# Patient Record
Sex: MALE | Race: WHITE | NOT HISPANIC OR LATINO | Employment: FULL TIME | URBAN - METROPOLITAN AREA
[De-identification: names, ages, dates, MRNs, and addresses within clinical notes are randomized per-mention and may not be internally consistent; named-entity substitution may affect disease eponyms.]

---

## 2021-06-02 ENCOUNTER — OFFICE VISIT CONVERTED (OUTPATIENT)
Dept: OTOLARYNGOLOGY | Facility: CLINIC | Age: 61
End: 2021-06-02
Attending: OTOLARYNGOLOGY

## 2021-06-02 ENCOUNTER — CONVERSION ENCOUNTER (OUTPATIENT)
Dept: OTOLARYNGOLOGY | Facility: CLINIC | Age: 61
End: 2021-06-02

## 2021-06-05 NOTE — H&P
"   History and Physical      Patient Name: Cedric Canseco   Patient ID: [id number]   Sex: Male   YOB: 1960    Primary Care Provider: No PCP No PCP Other   Referring Provider: Tyrel Weiner    Visit Date: June 2, 2021    Provider: Manoj Fink MD   Location: [facility]   Location Address: [address] [place] [address], [address] [place] [address]  [id number]   Location Phone: [phone]          Chief Complaint     \"It just happened really quickly.\"       History Of Present Illness  Cedric Canseco is a 60 year old /White male who presents to the office today as a consult from Tyrel Weiner for evaluation of left orbital fractures. He tells me that he was working on the mowing deck of his [employer] tractor when a drop down quickly smashing his face. Immediately noticed bleeding from his face and swelling to the left side of his face. He was evaluated in the emergency department on the afternoon of 5/28/2021 where a CT scan of the face without contrast revealed minimally displaced left orbital floor and moderately displaced left lamina papyracea fracture. There is no evidence of entrapment radiographically and on examination that day he was moving his eye fully according to RADHAMES Mosquera. He was discharged home with erythromycin ointment, Norco, and Keflex. He tells me that he feels as though his left facial swelling has improved significantly. He denies any vision changes, diplopia, epistaxis, malocclusion, or trismus. He has never experienced any prior head or neck trauma or surgery. He does not smoke.       Past Medical History  Hypertension; Orbital fracture         Past Surgical History  *No Past Surgical History         Allergy List  NO KNOWN DRUG ALLERGIES         Family Medical History  *No Known Family History         Social History  *Denies Alcohol Use; Tobacco (Never)         Review of Systems  · Constitutional  o Denies  o : fever, night sweats, weight " "loss  · Eyes  o Denies  o : discharge from eye, impaired vision  · HENT  o Admits  o : *See HPI  · Cardiovascular  o Denies  o : chest pain, irregular heart beats  · Respiratory  o Denies  o : shortness of breath, wheezing, coughing up blood  · Gastrointestinal  o Denies  o : heartburn, reflux, vomiting blood  · Genitourinary  o Denies  o : frequency  · Integument  o Denies  o : rash, skin dryness  · Neurologic  o Denies  o : seizures, loss of balance, loss of consciousness, dizziness  · Endocrine  o Denies  o : cold intolerance, heat intolerance  · Heme-Lymph  o Denies  o : easy bleeding, anemia      Vitals  Date Time BP Position Site L\R Cuff Size HR RR TEMP (F) WT  HT  BMI kg/m2 BSA m2 O2 Sat FR L/min FiO2 HC       06/02/2021 04:27 PM        97 204lbs 8oz 5'  6\" 33.01 2.08             Physical Examination  · Constitutional  o Appearance  o : well developed, well-nourished, alert and in no acute distress, voice clear and strong  · Head and Face  o Head  o :   § Inspection  § : no deformities or lesions  o Face  o :   § Inspection  § : Left periorbital ecchymosis and edema; House-Brackmann I/VI bilaterally. No mobility or step-offs upon palpation. Left lid laceration is clean, dry, and intact with sutures in place. Same for the nasal dorsum laceration  § Palpation  § : No TMJ crepitus nor  muscle tenderness bilaterally  · Eyes  o Vision  o :   § Visual Fields  § : Extraocular movements are intact. No spontaneous or gaze-induced nystagmus.  o Conjunctivae  o : On the right, subconjunctival hemorrhage on the left  o Sclerae  o : clear  o Pupils and Irises  o : pupils equal, round, and reactive to light.   · Ears, Nose, Mouth and Throat  o Ears  o :   § External Ears  § : appearance within normal limits, no lesions present  § Otoscopic Examination  § : tympanic membrane appearance within normal limits bilaterally without perforations, well-aerated middle ears  § Hearing  § : intact to conversational voice " both ears  o Nose  o :   § External Nose  § : appearance normal  § Intranasal Exam  § : mucosa within normal limits, vestibules normal, no intranasal lesions present, septum midline, sinuses non tender to percussion  o Oral Cavity  o :   § Oral Mucosa  § : oral mucosa normal without pallor or cyanosis  § Lips  § : lip appearance normal  § Teeth  § : Partial, worn dentition for age  § Gums  § : gums pink, non-swollen, no bleeding present  § Tongue  § : tongue appearance normal; normal mobility  § Palate  § : hard palate normal, soft palate appearance normal with symmetric mobility  o Throat  o :   § Oropharynx  § : no inflammation or lesions present, tonsils within normal limits  § Hypopharynx  § : Deferred secondary to gag reflex  § Larynx  § : Deferred secondary to gag reflex  · Neck  o Inspection/Palpation  o : normal appearance, no masses or tenderness, trachea midline; thyroid size normal, nontender, no nodules or masses present on palpation  · Respiratory  o Respiratory Effort  o : breathing unlabored  o Inspection of Chest  o : normal appearance, no retractions  · Cardiovascular  o Heart  o : regular rate and rhythm  · Lymphatic  o Neck  o : no lymphadenopathy present  o Supraclavicular Nodes  o : no lymphadenopathy present  o Preauricular Nodes  o : no lymphadenopathy present  · Skin and Subcutaneous Tissue  o General Inspection  o : Regarding face and neck - there are no rashes present, no lesions present, and no areas of discoloration  · Neurologic  o Cranial Nerves  o : cranial nerves II-XII are grossly intact bilaterally  o Gait and Station  o : normal gait, able to stand without diffculty  · Psychiatric  o Judgement and Insight  o : judgment and insight intact  o Mood and Affect  o : mood normal, affect appropriate          Assessment  · Orbital floor (blow-out) open fracture, with routine healing, subsequent encounter     V54.19/S02.30XD  · Lamina papyracea  fracture     801.00/S02.19XA      Plan  · Medications  o Medications have been Reconciled  o Transition of Care or Provider Policy  · Instructions  o Impressions and findings were discussed with Mr. Canseco and his wife at great length. Currently, he is seen for evaluation of a left orbital floor and left lamina papyracea fracture. Examination today reveals significant left periorbital ecchymosis and edema, left subconjunctival hemorrhage, but full extraocular eye movements and no evidence of enophthalmos. We reviewed the images from his 5/28/2021 CT scan of the face without contrast. We discussed the pathophysiology and natural history of this injury. Options for management were discussed and as there is no evidence of entrapment and he is unlikely to develop enophthalmos we will pursue natural healing. We discussed the expected course and natural healing and he may return to work this following Monday and follow-up with me on an as-needed basis.  · Correspondence  o ENT Letter to Referring MD (Tyrel Weiner) - 06/02/2021            Electronically Signed by: Manoj Fink MD -Author on June 2, 2021 05:30:27 PM

## 2021-07-15 VITALS — WEIGHT: 204.5 LBS | TEMPERATURE: 97 F | HEIGHT: 66 IN | BODY MASS INDEX: 32.87 KG/M2

## 2022-01-11 ENCOUNTER — HOSPITAL ENCOUNTER (EMERGENCY)
Facility: HOSPITAL | Age: 62
Discharge: HOME | End: 2022-01-11
Attending: EMERGENCY MEDICINE | Admitting: EMERGENCY MEDICINE

## 2022-01-11 VITALS
RESPIRATION RATE: 17 BRPM | WEIGHT: 199.08 LBS | TEMPERATURE: 97.4 F | HEIGHT: 66 IN | OXYGEN SATURATION: 99 % | SYSTOLIC BLOOD PRESSURE: 154 MMHG | BODY MASS INDEX: 31.99 KG/M2 | HEART RATE: 61 BPM | DIASTOLIC BLOOD PRESSURE: 91 MMHG

## 2022-01-11 DIAGNOSIS — E87.6 HYPOKALEMIA: ICD-10-CM

## 2022-01-11 DIAGNOSIS — R33.9 URINARY RETENTION: Primary | ICD-10-CM

## 2022-01-11 DIAGNOSIS — N30.90 CYSTITIS: ICD-10-CM

## 2022-01-11 LAB
ALBUMIN SERPL-MCNC: 4.8 G/DL (ref 3.5–5.2)
ALBUMIN/GLOB SERPL: 1.4 G/DL
ALP SERPL-CCNC: 177 U/L (ref 39–117)
ALT SERPL W P-5'-P-CCNC: 24 U/L (ref 1–41)
ANION GAP SERPL CALCULATED.3IONS-SCNC: 14.1 MMOL/L (ref 5–15)
AST SERPL-CCNC: 26 U/L (ref 1–40)
BACTERIA UR QL AUTO: ABNORMAL /HPF
BASOPHILS # BLD AUTO: 0.03 10*3/MM3 (ref 0–0.2)
BASOPHILS NFR BLD AUTO: 0.4 % (ref 0–1.5)
BILIRUB SERPL-MCNC: 0.5 MG/DL (ref 0–1.2)
BILIRUB UR QL STRIP: NEGATIVE
BUN SERPL-MCNC: 16 MG/DL (ref 8–23)
BUN/CREAT SERPL: 14.5 (ref 7–25)
CALCIUM SPEC-SCNC: 9.6 MG/DL (ref 8.6–10.5)
CHLORIDE SERPL-SCNC: 91 MMOL/L (ref 98–107)
CLARITY UR: CLEAR
CO2 SERPL-SCNC: 26.9 MMOL/L (ref 22–29)
COLOR UR: YELLOW
CREAT SERPL-MCNC: 1.1 MG/DL (ref 0.76–1.27)
DEPRECATED RDW RBC AUTO: 39.3 FL (ref 37–54)
EOSINOPHIL # BLD AUTO: 0.05 10*3/MM3 (ref 0–0.4)
EOSINOPHIL NFR BLD AUTO: 0.6 % (ref 0.3–6.2)
ERYTHROCYTE [DISTWIDTH] IN BLOOD BY AUTOMATED COUNT: 12.9 % (ref 12.3–15.4)
GFR SERPL CREATININE-BSD FRML MDRD: 68 ML/MIN/1.73
GLOBULIN UR ELPH-MCNC: 3.5 GM/DL
GLUCOSE SERPL-MCNC: 106 MG/DL (ref 65–99)
GLUCOSE UR STRIP-MCNC: NEGATIVE MG/DL
HCT VFR BLD AUTO: 47 % (ref 37.5–51)
HGB BLD-MCNC: 16.7 G/DL (ref 13–17.7)
HGB UR QL STRIP.AUTO: NEGATIVE
HYALINE CASTS UR QL AUTO: ABNORMAL /LPF
IMM GRANULOCYTES # BLD AUTO: 0.03 10*3/MM3 (ref 0–0.05)
IMM GRANULOCYTES NFR BLD AUTO: 0.4 % (ref 0–0.5)
KETONES UR QL STRIP: NEGATIVE
LEUKOCYTE ESTERASE UR QL STRIP.AUTO: ABNORMAL
LYMPHOCYTES # BLD AUTO: 1.37 10*3/MM3 (ref 0.7–3.1)
LYMPHOCYTES NFR BLD AUTO: 17.7 % (ref 19.6–45.3)
MCH RBC QN AUTO: 30 PG (ref 26.6–33)
MCHC RBC AUTO-ENTMCNC: 35.5 G/DL (ref 31.5–35.7)
MCV RBC AUTO: 84.5 FL (ref 79–97)
MONOCYTES # BLD AUTO: 0.73 10*3/MM3 (ref 0.1–0.9)
MONOCYTES NFR BLD AUTO: 9.4 % (ref 5–12)
NEUTROPHILS NFR BLD AUTO: 5.53 10*3/MM3 (ref 1.7–7)
NEUTROPHILS NFR BLD AUTO: 71.5 % (ref 42.7–76)
NITRITE UR QL STRIP: NEGATIVE
NRBC BLD AUTO-RTO: 0 /100 WBC (ref 0–0.2)
PH UR STRIP.AUTO: 6 [PH] (ref 5–8)
PLATELET # BLD AUTO: 260 10*3/MM3 (ref 140–450)
PMV BLD AUTO: 9.7 FL (ref 6–12)
POTASSIUM SERPL-SCNC: 2.7 MMOL/L (ref 3.5–5.2)
PROT SERPL-MCNC: 8.3 G/DL (ref 6–8.5)
PROT UR QL STRIP: NEGATIVE
RBC # BLD AUTO: 5.56 10*6/MM3 (ref 4.14–5.8)
RBC # UR STRIP: ABNORMAL /HPF
REF LAB TEST METHOD: ABNORMAL
SODIUM SERPL-SCNC: 132 MMOL/L (ref 136–145)
SP GR UR STRIP: 1.01 (ref 1–1.03)
SQUAMOUS #/AREA URNS HPF: ABNORMAL /HPF
UROBILINOGEN UR QL STRIP: ABNORMAL
WBC # UR STRIP: ABNORMAL /HPF
WBC NRBC COR # BLD: 7.74 10*3/MM3 (ref 3.4–10.8)

## 2022-01-11 PROCEDURE — 87186 SC STD MICRODIL/AGAR DIL: CPT | Performed by: EMERGENCY MEDICINE

## 2022-01-11 PROCEDURE — 51798 US URINE CAPACITY MEASURE: CPT

## 2022-01-11 PROCEDURE — 80053 COMPREHEN METABOLIC PANEL: CPT | Performed by: EMERGENCY MEDICINE

## 2022-01-11 PROCEDURE — 81001 URINALYSIS AUTO W/SCOPE: CPT | Performed by: EMERGENCY MEDICINE

## 2022-01-11 PROCEDURE — 87086 URINE CULTURE/COLONY COUNT: CPT | Performed by: EMERGENCY MEDICINE

## 2022-01-11 PROCEDURE — 87077 CULTURE AEROBIC IDENTIFY: CPT | Performed by: EMERGENCY MEDICINE

## 2022-01-11 PROCEDURE — 99283 EMERGENCY DEPT VISIT LOW MDM: CPT

## 2022-01-11 PROCEDURE — 85025 COMPLETE CBC W/AUTO DIFF WBC: CPT | Performed by: EMERGENCY MEDICINE

## 2022-01-11 PROCEDURE — 36415 COLL VENOUS BLD VENIPUNCTURE: CPT

## 2022-01-11 RX ORDER — CEPHALEXIN 500 MG/1
500 CAPSULE ORAL 2 TIMES DAILY
Qty: 14 CAPSULE | Refills: 0 | Status: SHIPPED | OUTPATIENT
Start: 2022-01-11

## 2022-01-11 RX ORDER — ATENOLOL AND CHLORTHALIDONE 50; 25 MG/1; MG/1
TABLET ORAL EVERY 12 HOURS SCHEDULED
COMMUNITY

## 2022-01-11 RX ORDER — POTASSIUM CHLORIDE 750 MG/1
40 CAPSULE, EXTENDED RELEASE ORAL ONCE
Status: COMPLETED | OUTPATIENT
Start: 2022-01-11 | End: 2022-01-11

## 2022-01-11 RX ORDER — TAMSULOSIN HYDROCHLORIDE 0.4 MG/1
1 CAPSULE ORAL DAILY
Qty: 14 CAPSULE | Refills: 0 | Status: SHIPPED | OUTPATIENT
Start: 2022-01-11 | End: 2022-01-14 | Stop reason: SDUPTHER

## 2022-01-11 RX ORDER — METHYLPREDNISOLONE 4 MG/1
4 TABLET ORAL DAILY
COMMUNITY
End: 2022-01-13

## 2022-01-11 RX ORDER — POTASSIUM CHLORIDE 750 MG/1
10 TABLET, FILM COATED, EXTENDED RELEASE ORAL DAILY
Qty: 3 TABLET | Refills: 0 | Status: SHIPPED | OUTPATIENT
Start: 2022-01-11

## 2022-01-11 RX ADMIN — POTASSIUM CHLORIDE 40 MEQ: 750 CAPSULE, EXTENDED RELEASE ORAL at 18:41

## 2022-01-11 NOTE — ED PROVIDER NOTES
Subjective   PT states today started to have trouble urinating. Feels like he can't go completely and when he does it dribbles. Has not had this before. Denies blood in urine or burning when he does urinate. No recent surgeries.  No prostate issues/bladder issues that he knows of.  Does not have PCP/urologist.       History provided by:  Patient  Dysuria  Location:  Retention  Severity:  Moderate  Onset quality:  Sudden  Duration:  12 hours  Timing:  Unable to specify  Progression:  Worsening  Chronicity:  New  Associated symptoms: no fatigue and no fever        Review of Systems   Constitutional: Negative for appetite change, chills, fatigue and fever.   HENT: Negative.    Eyes: Negative.  Negative for photophobia.   Respiratory: Negative.    Gastrointestinal: Negative.    Endocrine: Negative.    Genitourinary: Positive for dysuria.   Musculoskeletal: Negative.    Skin: Negative.    Allergic/Immunologic: Negative.  Negative for immunocompromised state.   Neurological: Negative.    Hematological: Negative.    Psychiatric/Behavioral: Negative.    All other systems reviewed and are negative.      Past Medical History:   Diagnosis Date   • Arthritis        No Known Allergies    History reviewed. No pertinent surgical history.    History reviewed. No pertinent family history.    Social History     Socioeconomic History   • Marital status:            Objective   Physical Exam  Vitals and nursing note reviewed.   Constitutional:       General: He is not in acute distress.     Appearance: Normal appearance. He is normal weight. He is not ill-appearing or toxic-appearing.   HENT:      Head: Normocephalic and atraumatic.   Cardiovascular:      Rate and Rhythm: Normal rate and regular rhythm.      Heart sounds: Normal heart sounds.   Pulmonary:      Effort: Pulmonary effort is normal.      Breath sounds: Normal breath sounds.   Abdominal:      General: Abdomen is flat. Bowel sounds are normal.      Palpations: Abdomen  is soft.      Tenderness: There is no right CVA tenderness or left CVA tenderness.   Musculoskeletal:         General: Normal range of motion.      Cervical back: Normal range of motion.   Neurological:      Mental Status: He is alert and oriented to person, place, and time. Mental status is at baseline.   Psychiatric:         Mood and Affect: Mood normal.         Behavior: Behavior normal.         Thought Content: Thought content normal.         Judgment: Judgment normal.         ED Course  ED Course as of 01/11/22 1901 Tue Jan 11, 2022   1734 Roldan placed in patient due to patient retaining around 700 ml.  [BE]   1824 Pt feels better but does not want roldan in. Aware that he may have to return.  [BE]      ED Course User Index  [BE] Donell Burciaga PA            MDM  Number of Diagnoses or Management Options  Diagnosis management comments: Pt is a 61 y.o. male that presents today with Patient presents with:  Difficulty Urinating       Work up today:  Lab Results (last 24 hours)     Procedure Component Value Units Date/Time    CBC & Differential ([id number])  (Abnormal) Collected: 01/11/22 1648    Specimen: Blood Updated: 01/11/22 1707    Narrative:      The following orders were created for panel order CBC & Differential.  Procedure                               Abnormality         Status                       ---------                               -----------         ------                       CBC Auto Differential([id number])        Abnormal            Final result                   Please view results for these tests on the individual orders.    Comprehensive Metabolic Panel ([id number])  (Abnormal) Collected: 01/11/22 1648    Specimen: Blood Updated: 01/11/22 1722     Glucose 106 mg/dL      BUN 16 mg/dL      Creatinine 1.10 mg/dL      Sodium 132 mmol/L      Potassium 2.7 mmol/L      Chloride 91 mmol/L      CO2 26.9 mmol/L      Calcium 9.6 mg/dL      Total Protein 8.3 g/dL      Albumin 4.80 g/dL      ALT  (SGPT) 24 U/L      AST (SGOT) 26 U/L      Alkaline Phosphatase 177 U/L      Total Bilirubin 0.5 mg/dL      eGFR Non African Amer 68 mL/min/1.73      Globulin 3.5 gm/dL      A/G Ratio 1.4 g/dL      BUN/Creatinine Ratio 14.5     Anion Gap 14.1 mmol/L     Narrative:      GFR Normal >60  Chronic Kidney Disease <60  Kidney Failure <15      CBC Auto Differential ([id number])  (Abnormal) Collected: 01/11/22 1648    Specimen: Blood Updated: 01/11/22 1707     WBC 7.74 10*3/mm3      RBC 5.56 10*6/mm3      Hemoglobin 16.7 g/dL      Hematocrit 47.0 %      MCV 84.5 fL      MCH 30.0 pg      MCHC 35.5 g/dL      RDW 12.9 %      RDW-SD 39.3 fl      MPV 9.7 fL      Platelets 260 10*3/mm3      Neutrophil % 71.5 %      Lymphocyte % 17.7 %      Monocyte % 9.4 %      Eosinophil % 0.6 %      Basophil % 0.4 %      Immature Grans % 0.4 %      Neutrophils, Absolute 5.53 10*3/mm3      Lymphocytes, Absolute 1.37 10*3/mm3      Monocytes, Absolute 0.73 10*3/mm3      Eosinophils, Absolute 0.05 10*3/mm3      Basophils, Absolute 0.03 10*3/mm3      Immature Grans, Absolute 0.03 10*3/mm3      nRBC 0.0 /100 WBC     Urinalysis With Culture If Indicated - Urine, Clean Catch ([id number])    (Abnormal) Collected: 01/11/22 1730    Specimen: Urine, Clean Catch Updated: 01/11/22 1808     Color, UA Yellow     Appearance, UA Clear     pH, UA 6.0     Specific Gravity, UA 1.012     Glucose, UA Negative     Ketones, UA Negative     Bilirubin, UA Negative     Blood, UA Negative     Protein, UA Negative     Leuk Esterase, UA Trace     Nitrite, UA Negative     Urobilinogen, UA 1.0 E.U./dL    Urinalysis, Microscopic Only - Urine, Clean Catch ([id number])  (Abnormal)   Collected: 01/11/22 1730    Specimen: Urine, Clean Catch Updated: 01/11/22 1820     RBC, UA 0-2 /HPF      WBC, UA 6-12 /HPF      Bacteria, UA 2+ /HPF      Squamous Epithelial Cells, UA 0-2 /HPF      Hyaline Casts, UA 0-2 /LPF      Methodology Automated Microscopy    Urine Culture - Urine, Urine, Clean  Catch ([id number]) Collected: 01/11/22   1730    Specimen: Urine, Clean Catch Updated: 01/11/22 1820      No results found.   @No orders to display     Pt presents with urinary retention for about 12 hours. Has some dribbling when he attempts to urinate. Denies blood or pain with urination.  No recent surgeries. Bladder scan showed about 700 ml in.  Roldan placed.  Symptoms better.  Incidentally found mild hypokalemia at 2.7 probably due to Chlorthalidone for BP.  Given oral Potassium here and will give him a few days for home.   He does not want roldan for home and aware he may return. Does have UTI.     The patient will pursue further outpatient evaluation with the primary care physician or other designated or consulting physician as outlined in the discharge instructions. They are agreeable to this plan of care and follow-up instructions have been explained in detail. The patient has received these instructions in written format and have expressed an understanding of the discharge instructions. The patient is aware that any significant change in condition or worsening of symptoms should prompt an immediate return to this or the closest emergency department or call to 911.  Pt is otherwise non toxic and non ill appearing and stable for d/c home.  Pt is in agreement with this.  All questions answered at bedside.        Amount and/or Complexity of Data Reviewed  Clinical lab tests: reviewed    Risk of Complications, Morbidity, and/or Mortality  Presenting problems: moderate  Diagnostic procedures: moderate  Management options: moderate    Patient Progress  Patient progress: stable      Final diagnoses:   Urinary retention   Cystitis   Hypokalemia       ED Disposition  ED Disposition     ED Disposition Condition Comment    Discharge Stable           Betzy Louise MD  [address] [facility] [place] [address]  [phone]               Medication List      New Prescriptions    cephalexin 500 MG capsule  Commonly known  as: KEFLEX  Take 1 capsule by mouth 2 (Two) Times a Day.     potassium chloride 10 MEQ CR tablet  Take 1 tablet by mouth Daily.     tamsulosin 0.4 MG capsule 24 hr capsule  Commonly known as: FLOMAX  Take 1 capsule by mouth Daily.           Where to Get Your Medications      These medications were sent to [facility] [address] [place], [place] [address], [place] - [address] [facility] AT [facility] [address] - [phone]  - [phone] [address] [address]    Phone: [phone]   · cephalexin 500 MG capsule  · potassium chloride 10 MEQ CR tablet  · tamsulosin 0.4 MG capsule 24 hr capsule          Donell Burciaga PA  01/11/22 1900       Donell Burciaga PA  01/11/22 1901     PAIN

## 2022-01-12 ENCOUNTER — HOSPITAL ENCOUNTER (EMERGENCY)
Facility: HOSPITAL | Age: 62
Discharge: HOME | End: 2022-01-12
Attending: EMERGENCY MEDICINE | Admitting: EMERGENCY MEDICINE

## 2022-01-12 ENCOUNTER — TELEPHONE (OUTPATIENT)
Dept: UROLOGY | Facility: CLINIC | Age: 62
End: 2022-01-12

## 2022-01-12 VITALS
HEART RATE: 67 BPM | HEIGHT: 66 IN | OXYGEN SATURATION: 100 % | BODY MASS INDEX: 31.21 KG/M2 | WEIGHT: 194.22 LBS | SYSTOLIC BLOOD PRESSURE: 151 MMHG | RESPIRATION RATE: 16 BRPM | TEMPERATURE: 97.5 F | DIASTOLIC BLOOD PRESSURE: 93 MMHG

## 2022-01-12 DIAGNOSIS — R33.8 ACUTE URINARY RETENTION: Primary | ICD-10-CM

## 2022-01-12 DIAGNOSIS — U07.1 COVID-19: ICD-10-CM

## 2022-01-12 DIAGNOSIS — E87.6 HYPOKALEMIA: ICD-10-CM

## 2022-01-12 PROCEDURE — 99283 EMERGENCY DEPT VISIT LOW MDM: CPT

## 2022-01-12 NOTE — ED TRIAGE NOTES
Pt to ED 10 with c/o urinary retention. Pt states seen yesterday for same, was encouraged to stay and have urinary catheter remain but but declined and went home.  Pt reports has not had any urine output since cath was removed around 1630 yesterday.   Pt reports feeling of bladder fullness, states has not been able to get but a few drops of urine out when straining.  Pt a/ox4, gcs 15, in nad at this time.

## 2022-01-12 NOTE — TELEPHONE ENCOUNTER
[facility] ER 01/11 for retention.  Told to f/u with Dr. Louise.    [facility] ER 01/12 for retention.  Told to f/u with Dr. Parsons.    No imaging.       Patient with roldan catheter and Sonny, who called for him, said he was told to f/u this week for catheter removal.

## 2022-01-12 NOTE — ED NOTES
Patient discharged with roldan catheter and leg bag in place.      Jeanine Chavira, RN  01/12/22 0751

## 2022-01-12 NOTE — ED PROVIDER NOTES
Time: 0700  Arrived by: Private vehicle  Chief Complaint: Urinary retention  History provided by: Patient    History of Present Illness:  Patient is a 61 y.o. year old male that presents to the emergency department with history of recent diagnosis of COVID-19 now on some steroids and Z-Nahun antibiotics and cough syrup, who was just seen in the ED last night for urinary retention, now presents with similar symptoms.    Essentially he has to urinate badly and is only able to dribble a small amount of urine.    He has never had issues with prostate before.    He is on some NyQuil and other cough medicine that could be causing some retention.    He just had a straight catheter in and out performed yesterday in the ED with output of 1 L of retained urine, but did not want to go home with a catheter at that time.    He is back with the same exact issues and some suprapubic discomfort and fullness and unable to void.    As far as COVID he denies any dyspnea or fevers or trouble breathing and he has been vaccinated.    Similar Symptoms Previously: Yes  Recently seen: Yes, seen in ED last night for the same thing.      Patient Care Team  Primary Care Provider: Unknown    Past Medical History:   Urinary retention, COVID-19 positive    Social History     Socioeconomic History   • Marital status:    Tobacco Use   • Smoking status: Never Smoker   • Smokeless tobacco: Never Used   Vaping Use   • Vaping Use: Never used   Substance and Sexual Activity   • Alcohol use: Never   • Drug use: Never   • Sexual activity: Defer         No Known Allergies  Past Medical History:   Diagnosis Date   • Arthritis      History reviewed. No pertinent surgical history.  History reviewed. No pertinent family history.    Home Medications:  Prior to Admission medications    Medication Sig Start Date End Date Taking? Authorizing Provider   atenolol 50 MG tablet 50 mg, chlorthalidone 25 MG tablet 25 mg per tablet Every 12 (Twelve) Hours.     "Provider, MD Miguel   cephalexin (KEFLEX) 500 MG capsule Take 1 capsule by mouth 2 (Two) Times a Day. 1/11/22   Donell Burciaga PA   methylPREDNISolone (MEDROL) 4 MG tablet Take 4 mg by mouth Daily.    ProviderMiguel MD   potassium chloride 10 MEQ CR tablet Take 1 tablet by mouth Daily. 1/11/22   Donell Burciaga PA   tamsulosin (FLOMAX) 0.4 MG capsule 24 hr capsule Take 1 capsule by mouth Daily. 1/11/22   Donell Burciaga PA        Record Review:  I have reviewed the patient's records in [facility].     Review of Systems:  Review of Systems   I performed a 10 point review of systems that was all negative except for some cough and congestion, urinary retention.      Physical Exam:  /93   Pulse 67   Temp 97.5 °F (36.4 °C) (Oral)   Resp 16   Ht 167.6 cm (66\")   Wt 88.1 kg (194 lb 3.6 oz)   SpO2 100%   BMI 31.35 kg/m²     Physical Exam   General: Awake alert and in no obvious distress    HEENT: Head normocephalic atraumatic, eyes PERRLA EOMI, nose normal, oropharynx normal.    Neck: Supple full range of motion, no meningismus, no lymphadenopathy    Heart: Regular rate and rhythm, no murmurs or rubs, 2+ radial pulses bilaterally    Lungs: Clear to auscultation bilaterally without wheezes or crackles, no respiratory distress    Abdomen: Soft, mild tenderness and fullness in the suprapubic region, nondistended, no rebound or guarding    Skin: Warm, dry, no rash    Musculoskeletal: Normal range of motion, no lower extremity edema    Neurologic: Oriented x3, no motor deficits no sensory deficits    Psychiatric: Mood appears stable, no psychosis        Medications in the Emergency Department:  Medications - No data to display     Labs  Lab Results (last 24 hours)     Procedure Component Value Units Date/Time    CBC & Differential [[id number]]  (Abnormal) Collected: 01/11/22 1648    Specimen: Blood Updated: 01/11/22 1707    Narrative:      The following orders were created for panel order CBC & " Differential.  Procedure                               Abnormality         Status                     ---------                               -----------         ------                     CBC Auto Differential[[id number]]        Abnormal            Final result                 Please view results for these tests on the individual orders.    Comprehensive Metabolic Panel [[id number]]  (Abnormal) Collected: 01/11/22 1648    Specimen: Blood Updated: 01/11/22 1722     Glucose 106 mg/dL      BUN 16 mg/dL      Creatinine 1.10 mg/dL      Sodium 132 mmol/L      Potassium 2.7 mmol/L      Chloride 91 mmol/L      CO2 26.9 mmol/L      Calcium 9.6 mg/dL      Total Protein 8.3 g/dL      Albumin 4.80 g/dL      ALT (SGPT) 24 U/L      AST (SGOT) 26 U/L      Alkaline Phosphatase 177 U/L      Total Bilirubin 0.5 mg/dL      eGFR Non African Amer 68 mL/min/1.73      Globulin 3.5 gm/dL      A/G Ratio 1.4 g/dL      BUN/Creatinine Ratio 14.5     Anion Gap 14.1 mmol/L     Narrative:      GFR Normal >60  Chronic Kidney Disease <60  Kidney Failure <15      CBC Auto Differential [[id number]]  (Abnormal) Collected: 01/11/22 1648    Specimen: Blood Updated: 01/11/22 1707     WBC 7.74 10*3/mm3      RBC 5.56 10*6/mm3      Hemoglobin 16.7 g/dL      Hematocrit 47.0 %      MCV 84.5 fL      MCH 30.0 pg      MCHC 35.5 g/dL      RDW 12.9 %      RDW-SD 39.3 fl      MPV 9.7 fL      Platelets 260 10*3/mm3      Neutrophil % 71.5 %      Lymphocyte % 17.7 %      Monocyte % 9.4 %      Eosinophil % 0.6 %      Basophil % 0.4 %      Immature Grans % 0.4 %      Neutrophils, Absolute 5.53 10*3/mm3      Lymphocytes, Absolute 1.37 10*3/mm3      Monocytes, Absolute 0.73 10*3/mm3      Eosinophils, Absolute 0.05 10*3/mm3      Basophils, Absolute 0.03 10*3/mm3      Immature Grans, Absolute 0.03 10*3/mm3      nRBC 0.0 /100 WBC     Urinalysis With Culture If Indicated - Urine, Clean Catch [[id number]]  (Abnormal) Collected: 01/11/22 1730    Specimen: Urine, Clean Catch  Updated: 01/11/22 1808     Color, UA Yellow     Appearance, UA Clear     pH, UA 6.0     Specific Gravity, UA 1.012     Glucose, UA Negative     Ketones, UA Negative     Bilirubin, UA Negative     Blood, UA Negative     Protein, UA Negative     Leuk Esterase, UA Trace     Nitrite, UA Negative     Urobilinogen, UA 1.0 E.U./dL    Urinalysis, Microscopic Only - Urine, Clean Catch [[id number]]  (Abnormal) Collected: 01/11/22 1730    Specimen: Urine, Clean Catch Updated: 01/11/22 1820     RBC, UA 0-2 /HPF      WBC, UA 6-12 /HPF      Bacteria, UA 2+ /HPF      Squamous Epithelial Cells, UA 0-2 /HPF      Hyaline Casts, UA 0-2 /LPF      Methodology Automated Microscopy    Urine Culture - Urine, Urine, Clean Catch [[id number]] Collected: 01/11/22 1730    Specimen: Urine, Clean Catch Updated: 01/11/22 1820           Imaging:  No Radiology Exams Resulted Within Past 24 Hours     Procedures:  Procedures    Progress                            Medical Decision Making:  MDM       This patient is a pleasant 61-year-old male with COVID-19 currently who has been using some cough syrup and now has urinary retention.  Also presented for the same last night.    He has the same symptoms with suprapubic fullness and unable to urinate.    We are placing a Verma catheter and this time he agrees to leave it in place and follow-up at the urology clinic in a couple days to remove it.    I will have him avoid any antihistamines or pain meds or cough syrup for now as I think it is contributing to his symptoms.    From a COVID-19 viewpoint he is oxygenating well and lung fields are clear with good saturation of 100% on room air.    I do note his potassium is low and I will give him some potassium chloride and call in a prescription and have him follow-up at urology this week.      Patient is already urinating out over 700 cc after Verma placement and feels better.  He will be discharged with Verma in place to follow-up at urology clinic.    Final  diagnoses:   Acute urinary retention   COVID-19   Hypokalemia        Disposition:  ED Disposition     ED Disposition Condition Comment    Discharge Stable                  Aroldo Guzmán MD  01/12/22 0788

## 2022-01-13 ENCOUNTER — TELEPHONE (OUTPATIENT)
Dept: UROLOGY | Facility: CLINIC | Age: 62
End: 2022-01-13

## 2022-01-13 ENCOUNTER — TELEPHONE (OUTPATIENT)
Dept: EMERGENCY DEPT | Facility: HOSPITAL | Age: 62
End: 2022-01-13

## 2022-01-13 PROBLEM — M19.019 LOCALIZED, PRIMARY OSTEOARTHRITIS OF SHOULDER REGION: Status: ACTIVE | Noted: 2022-01-13

## 2022-01-13 PROBLEM — I10 ESSENTIAL HYPERTENSION: Status: ACTIVE | Noted: 2022-01-13

## 2022-01-13 PROBLEM — E66.9 OBESITY: Status: ACTIVE | Noted: 2022-01-13

## 2022-01-13 PROBLEM — S02.85XA ORBITAL FRACTURE (HCC): Status: ACTIVE | Noted: 2022-01-13

## 2022-01-13 LAB — BACTERIA SPEC AEROBE CULT: ABNORMAL

## 2022-01-13 RX ORDER — FLUTICASONE PROPIONATE 50 MCG
SPRAY, SUSPENSION (ML) NASAL
COMMUNITY
Start: 2022-01-10

## 2022-01-13 RX ORDER — METHYLPREDNISOLONE 4 MG/1
TABLET ORAL
COMMUNITY
Start: 2022-01-10

## 2022-01-13 RX ORDER — BROMPHENIRAMINE MALEATE, PSEUDOEPHEDRINE HYDROCHLORIDE, AND DEXTROMETHORPHAN HYDROBROMIDE 2; 30; 10 MG/5ML; MG/5ML; MG/5ML
SYRUP ORAL
COMMUNITY
Start: 2022-01-10

## 2022-01-13 NOTE — TELEPHONE ENCOUNTER
Patient was called from the hospital about urine results, and was told Keflex was not strong enough to treat what he has, and to ask if Dr. Louise wants him to start on Macrobid.      See phone encounter in chart from today.

## 2022-01-13 NOTE — TELEPHONE ENCOUNTER
told pt about results and told him to ask urologist tomorrow if he wants him to stop keflex and start macrobid.

## 2022-01-13 NOTE — TELEPHONE ENCOUNTER
INSTRUCTED HOW TO REMOVE CATH AT HOME, REPEATED BACK INSTRUCTIONS. WILL DO VOID TRIAL IN THE MORNING. AGREEABLE.

## 2022-01-13 NOTE — TELEPHONE ENCOUNTER
SPOKE TO [facility], ADVISED TO REMOVE CATH BEFORE BEDTIME TONIGHT (GAVE INSTRUCTIONS HOW TO DO SO). SHE REPEATED BACK INSTRUCTIONS. TOLD HER WE WILL DO VOID TRIAL IN THE MORNING. AGREEABLE TO PLAN.

## 2022-01-14 ENCOUNTER — OFFICE VISIT (OUTPATIENT)
Dept: UROLOGY | Facility: CLINIC | Age: 62
End: 2022-01-14

## 2022-01-14 VITALS — BODY MASS INDEX: 32.14 KG/M2 | WEIGHT: 200 LBS | HEIGHT: 66 IN

## 2022-01-14 DIAGNOSIS — R33.9 URINARY RETENTION: Primary | ICD-10-CM

## 2022-01-14 DIAGNOSIS — N30.00 ACUTE CYSTITIS WITHOUT HEMATURIA: ICD-10-CM

## 2022-01-14 DIAGNOSIS — R39.14 BENIGN PROSTATIC HYPERPLASIA WITH INCOMPLETE BLADDER EMPTYING: ICD-10-CM

## 2022-01-14 DIAGNOSIS — N40.1 BENIGN PROSTATIC HYPERPLASIA WITH INCOMPLETE BLADDER EMPTYING: ICD-10-CM

## 2022-01-14 LAB — URINE VOLUME: 86

## 2022-01-14 PROCEDURE — 99204 OFFICE O/P NEW MOD 45 MIN: CPT | Performed by: UROLOGY

## 2022-01-14 PROCEDURE — 51798 US URINE CAPACITY MEASURE: CPT | Performed by: UROLOGY

## 2022-01-14 RX ORDER — TAMSULOSIN HYDROCHLORIDE 0.4 MG/1
1 CAPSULE ORAL DAILY
Qty: 90 CAPSULE | Refills: 3 | Status: SHIPPED | OUTPATIENT
Start: 2022-01-14 | End: 2022-11-18

## 2022-01-14 RX ORDER — LEVOFLOXACIN 500 MG/1
500 TABLET, FILM COATED ORAL DAILY
Qty: 7 TABLET | Refills: 0 | Status: SHIPPED | OUTPATIENT
Start: 2022-01-14 | End: 2022-01-21

## 2022-01-14 NOTE — PROGRESS NOTES
"    UROLOGY OFFICE H&P NOTE    Subjective   HPI  Cedric Canseco is a 61 y.o. male presents for evalualtion of acute urinary retention after presentation to ER x 2 (1/11/22 and 1/12/22).  First visit was straight cathed, refused indwelling catheter and upon recurrence of AUR, and re-presentation to ER catheter was placed.  Diagnosed with UTI. Notified that he needed a \"stronger antibiotic.  Also given Flomax; states this is significantly improved his urination since catheter removal. Now states that he is voiding better than prior to retention event. Wishes to stay on this.  Notes progressive weakening of stream over the last years as well as some straining. Never diagnosed with BPH or tried a prostate medication.    ____________  Urine culture, 1/11/22: >100,000 enterococcus    Labs 1/11/22:  Creatinine: 1.1  Wbc: 7.74      Results for orders placed or performed in visit on 01/14/22   Bladder Scan   Result Value Ref Range    Urine Volume 86          Medical History:  Past Medical History:   Diagnosis Date   • Arthritis         Social History:  Social History     Socioeconomic History   • Marital status:    Tobacco Use   • Smoking status: Never Smoker   • Smokeless tobacco: Never Used   Vaping Use   • Vaping Use: Never used   Substance and Sexual Activity   • Alcohol use: Never   • Drug use: Never   • Sexual activity: Defer        Family History:  History reviewed. No pertinent family history.     Surgical History:  History reviewed. No pertinent surgical history.     Allergies:  No Known Allergies     Current Medications:  Current Outpatient Medications   Medication Sig Dispense Refill   • atenolol 50 MG tablet 50 mg, chlorthalidone 25 MG tablet 25 mg per tablet Every 12 (Twelve) Hours.     • brompheniramine-pseudoephedrine-DM 30-2-10 MG/5ML syrup TAKE 10MLS BY MOUTH EVERY 4 HOURS AS NEEDED FOR COUGH     • cephalexin (KEFLEX) 500 MG capsule Take 1 capsule by mouth 2 (Two) Times a Day. 14 capsule 0   • " "fluticasone (FLONASE) 50 MCG/ACT nasal spray USE ONE SPRAY IN EACH NOSTRIL TWICE A DAY     • levoFLOXacin (Levaquin) 500 MG tablet Take 1 tablet by mouth Daily for 7 days. 7 tablet 0   • methylPREDNISolone (MEDROL) 4 MG dose pack TAKE AS DIRECTED ON PACKAGE FOR 6 DAYS     • potassium chloride 10 MEQ CR tablet Take 1 tablet by mouth Daily. 3 tablet 0   • tamsulosin (FLOMAX) 0.4 MG capsule 24 hr capsule Take 1 capsule by mouth Daily. 90 capsule 3     No current facility-administered medications for this visit.       Review of systems  Constitutional: Denies fever chills  GI: Denies nausea, vomiting    Objective     Vital Signs:   Ht 167.6 cm (66\")   Wt 90.7 kg (200 lb)   BMI 32.28 kg/m²       Physical exam  No acute distress, well-nourished  Awake alert and oriented  Mood normal; affect normal    Bladder Scan interpretation 01/14/2022    Estimation of residual urine via BVI 3000 [address] Bladder Scan  Residual Urine: 86 ml  Indication: Urinary retention    Benign prostatic hyperplasia with incomplete bladder emptying    Acute cystitis without hematuria   Position: Supine  Examination: Incremental scanning of the suprapubic area using 2.0 MHz transducer using copious amounts of acoustic gel.   Findings: An anechoic area was demonstrated which represented the bladder, with measurement of residual urine as noted. I inspected this myself. In that the residual urine was insignificant, refer to plan for treatment and plan necessary at this time.     Problem List:  Patient Active Problem List   Diagnosis   • Orbital fracture (HCC)   • Essential hypertension   • Obesity   • Localized, primary osteoarthritis of shoulder region       Assessment/Plan   Diagnoses and all orders for this visit:    1. Urinary retention (Primary)  -     tamsulosin (FLOMAX) 0.4 MG capsule 24 hr capsule; Take 1 capsule by mouth Daily.  Dispense: 90 capsule; Refill: 3    2. Benign prostatic hyperplasia with incomplete bladder emptying  -     " tamsulosin (FLOMAX) 0.4 MG capsule 24 hr capsule; Take 1 capsule by mouth Daily.  Dispense: 90 capsule; Refill: 3    3. Acute cystitis without hematuria  -     levoFLOXacin (Levaquin) 500 MG tablet; Take 1 tablet by mouth Daily for 7 days.  Dispense: 7 tablet; Refill: 0    Other orders  -     Bladder Scan    Patient with episode of acute urinary retention; voiding well with low postvoid residual after catheter removed.  Counseled to call urology or go to ER should he have recurrence of urinary retention.  Otherwise, plan to continue to monitor at subsequent visit.    Reports significant improvement of lower urinary tract symptoms with Flomax.  Continue Flomax; prescription sent to pharmacy    Urinary tract infection-culture sensitivities reviewed; switch antibiotic to Levaquin. Blackbox warning was discussed with patient and wife; noted understanding    Follow-up in 2 months, repeat PVR, or earlier as needed  All questions addressed           Signed:  Betzy Louise MD  01/14/22  07:44 EST      MDM low: 1 acute uncomplicated illness or injury; prescription drug management

## 2022-03-17 ENCOUNTER — OFFICE VISIT (OUTPATIENT)
Dept: UROLOGY | Facility: CLINIC | Age: 62
End: 2022-03-17

## 2022-03-17 VITALS — HEIGHT: 66 IN | WEIGHT: 201.2 LBS | BODY MASS INDEX: 32.33 KG/M2

## 2022-03-17 DIAGNOSIS — N40.1 BENIGN PROSTATIC HYPERPLASIA WITH INCOMPLETE BLADDER EMPTYING: ICD-10-CM

## 2022-03-17 DIAGNOSIS — R33.9 URINARY RETENTION: Primary | ICD-10-CM

## 2022-03-17 DIAGNOSIS — R39.14 BENIGN PROSTATIC HYPERPLASIA WITH INCOMPLETE BLADDER EMPTYING: ICD-10-CM

## 2022-03-17 PROCEDURE — 51798 US URINE CAPACITY MEASURE: CPT | Performed by: UROLOGY

## 2022-03-17 PROCEDURE — 99212 OFFICE O/P EST SF 10 MIN: CPT | Performed by: UROLOGY

## 2022-03-17 NOTE — PROGRESS NOTES
"    UROLOGY OFFICE FOLLOW-UP NOTE    Subjective   HPI  Cedric Canseco is a 61 y.o. male presents for evalualtion of acute urinary retention after presentation to ER x 2 (1/11/22 and 1/12/22).  First visit was straight cathed, refused indwelling catheter and upon recurrence of AUR, and re-presentation to ER catheter was placed.  Diagnosed with UTI. Notified that he needed a \"stronger antibiotic.  Also given Flomax; states this is significantly improved his urination since catheter removal. Now states that he is voiding better than prior to retention event. Wishes to stay on this.  Notes progressive weakening of stream over the last years as well as some straining. Never diagnosed with BPH or tried a prostate medication.    Update 3/18/2022: Patient presents for follow-up, of BPH, PVR.  Continues to take Flomax.  States this is significantly helping.  Voiding with good strong stream.  No new complaints today.  ____________  Urine culture, 1/11/22: >100,000 enterococcus    Labs 1/11/22:  Creatinine: 1.1  Wbc: 7.74      Results for orders placed or performed in visit on 01/14/22   Bladder Scan   Result Value Ref Range    Urine Volume 86          Medical History:  Past Medical History:   Diagnosis Date   • Arthritis         Social History:  Social History     Socioeconomic History   • Marital status:    Tobacco Use   • Smoking status: Never Smoker   • Smokeless tobacco: Never Used   Vaping Use   • Vaping Use: Never used   Substance and Sexual Activity   • Alcohol use: Never   • Drug use: Never   • Sexual activity: Defer        Current Medications:  Current Outpatient Medications   Medication Sig Dispense Refill   • atenolol 50 MG tablet 50 mg, chlorthalidone 25 MG tablet 25 mg per tablet Every 12 (Twelve) Hours.     • brompheniramine-pseudoephedrine-DM 30-2-10 MG/5ML syrup TAKE 10MLS BY MOUTH EVERY 4 HOURS AS NEEDED FOR COUGH     • cephalexin (KEFLEX) 500 MG capsule Take 1 capsule by mouth 2 (Two) Times a Day. " "14 capsule 0   • fluticasone (FLONASE) 50 MCG/ACT nasal spray USE ONE SPRAY IN EACH NOSTRIL TWICE A DAY     • methylPREDNISolone (MEDROL) 4 MG dose pack TAKE AS DIRECTED ON PACKAGE FOR 6 DAYS     • potassium chloride 10 MEQ CR tablet Take 1 tablet by mouth Daily. 3 tablet 0   • tamsulosin (FLOMAX) 0.4 MG capsule 24 hr capsule Take 1 capsule by mouth Daily. 90 capsule 3     No current facility-administered medications for this visit.       Review of systems  Constitutional: Denies fever chills  GI: Denies nausea, vomiting    Objective     Vital Signs:   Ht 167.6 cm (66\")   Wt 91.3 kg (201 lb 3.2 oz)   BMI 32.47 kg/m²       Physical exam  No acute distress, well-nourished  Awake alert and oriented  Mood normal; affect normal    Bladder Scan interpretation 3/17/22  Estimation of residual urine via [id number] [address] Bladder Scan  Residual Urine: 0 ml  Indication: Urinary retention    Benign prostatic hyperplasia with incomplete bladder emptying   Position: Supine  Examination: Incremental scanning of the suprapubic area using 2.0 MHz transducer using copious amounts of acoustic gel.   Findings: An anechoic area was demonstrated which represented the bladder, with measurement of residual urine as noted. I inspected this myself. In that the residual urine was insignificant, refer to plan for treatment and plan necessary at this time.     Problem List:  Patient Active Problem List   Diagnosis   • Orbital fracture (HCC)   • Essential hypertension   • Obesity   • Localized, primary osteoarthritis of shoulder region       Assessment/Plan   Diagnoses and all orders for this visit:    1. Urinary retention (Primary)    2. Benign prostatic hyperplasia with incomplete bladder emptying    Patient with episode of acute urinary retention; voiding well with low postvoid residual after catheter removed.  Counseled to call urology or go to ER should he have recurrence of urinary retention.  Otherwise, plan to continue to monitor at " subsequent visit.    Reports significant improvement of lower urinary tract symptoms with Flomax.  Continue Flomax; prescription sent to pharmacy    Urinary tract infection-culture sensitivities reviewed; switch antibiotic to Levaquin. Blackbox warning was discussed with patient and wife; noted understanding    Follow-up in 2 months, repeat PVR, or earlier as needed  All questions addressed           Signed:  Betzy Louise MD  01/14/22  07:44 EST      MDM low: 1 acute uncomplicated illness or injury; prescription drug management

## 2022-03-20 NOTE — PROGRESS NOTES
"    UROLOGY OFFICE FOLLOW-UP NOTE    Subjective   HPI  Cedric Canseco is a 61 y.o. male presents for evalualtion of acute urinary retention after presentation to ER x 2 (1/11/22 and 1/12/22).  First visit was straight cathed, refused indwelling catheter and upon recurrence of AUR, and re-presentation to ER catheter was placed.  Diagnosed with UTI. Notified that he needed a \"stronger antibiotic.  Also given Flomax; states this is significantly improved his urination since catheter removal. Now states that he is voiding better than prior to retention event. Wishes to stay on this.  Notes progressive weakening of stream over the last years as well as some straining. Never diagnosed with BPH or tried a prostate medication.    Update 3/17/2022: Patient presents for follow-up, of BPH, PVR.  Continues to take Flomax.  States this is significantly helping.  Voiding with good strong stream.  No new complaints today.  Denies UTI symptoms today.      ____________  Urine culture, 1/11/22: >100,000 enterococcus    Labs 1/11/22:  Creatinine: 1.1  Wbc: 7.74        Medical History:  Past Medical History:   Diagnosis Date   • Arthritis         Social History:  Social History     Socioeconomic History   • Marital status:    Tobacco Use   • Smoking status: Never Smoker   • Smokeless tobacco: Never Used   Vaping Use   • Vaping Use: Never used   Substance and Sexual Activity   • Alcohol use: Never   • Drug use: Never   • Sexual activity: Defer        Current Medications:  Current Outpatient Medications   Medication Sig Dispense Refill   • atenolol 50 MG tablet 50 mg, chlorthalidone 25 MG tablet 25 mg per tablet Every 12 (Twelve) Hours.     • brompheniramine-pseudoephedrine-DM 30-2-10 MG/5ML syrup TAKE 10MLS BY MOUTH EVERY 4 HOURS AS NEEDED FOR COUGH     • cephalexin (KEFLEX) 500 MG capsule Take 1 capsule by mouth 2 (Two) Times a Day. 14 capsule 0   • fluticasone (FLONASE) 50 MCG/ACT nasal spray USE ONE SPRAY IN EACH NOSTRIL " "TWICE A DAY     • methylPREDNISolone (MEDROL) 4 MG dose pack TAKE AS DIRECTED ON PACKAGE FOR 6 DAYS     • potassium chloride 10 MEQ CR tablet Take 1 tablet by mouth Daily. 3 tablet 0   • tamsulosin (FLOMAX) 0.4 MG capsule 24 hr capsule Take 1 capsule by mouth Daily. 90 capsule 3     No current facility-administered medications for this visit.       Review of systems  Constitutional: Denies fever chills  GI: Denies nausea, vomiting    Objective     Vital Signs:   Ht 167.6 cm (66\")   Wt 91.3 kg (201 lb 3.2 oz)   BMI 32.47 kg/m²       Physical exam  No acute distress, well-nourished  Awake alert and oriented  Mood normal; affect normal    Bladder Scan interpretation 3/17/22  Estimation of residual urine via [employer] [address] Bladder Scan  Residual Urine: 0 ml  Performed by: Karis Leon LPN  Indication: Urinary retention    Benign prostatic hyperplasia with incomplete bladder emptying   Position: Supine  Examination: Incremental scanning of the suprapubic area using 2.0 MHz transducer using copious amounts of acoustic gel.   Findings: An anechoic area was demonstrated which represented the bladder, with measurement of residual urine as noted. I inspected this myself. In that the residual urine was insignificant, refer to plan for treatment and plan necessary at this time.     Problem List:  Patient Active Problem List   Diagnosis   • Orbital fracture (HCC)   • Essential hypertension   • Obesity   • Localized, primary osteoarthritis of shoulder region       Assessment/Plan   Diagnoses and all orders for this visit:    1. Urinary retention (Primary)    2. Benign prostatic hyperplasia with incomplete bladder emptying      Doing well; adequately emptying bladder without postvoid residual  Continue to monitor PVR given history of acute urinary retention   Continue Flomax      Plan for follow-up 1 year, PVR, or earlier as needed  All questions addressed       Electronically signed by Betzy Louise MD, 03/20/22, " 10:11 AM EDT.

## 2022-11-18 DIAGNOSIS — R39.14 BENIGN PROSTATIC HYPERPLASIA WITH INCOMPLETE BLADDER EMPTYING: ICD-10-CM

## 2022-11-18 DIAGNOSIS — R33.9 URINARY RETENTION: ICD-10-CM

## 2022-11-18 DIAGNOSIS — N40.1 BENIGN PROSTATIC HYPERPLASIA WITH INCOMPLETE BLADDER EMPTYING: ICD-10-CM

## 2022-11-18 RX ORDER — TAMSULOSIN HYDROCHLORIDE 0.4 MG/1
CAPSULE ORAL
Qty: 90 CAPSULE | Refills: 3 | Status: SHIPPED | OUTPATIENT
Start: 2022-11-18